# Patient Record
Sex: FEMALE
[De-identification: names, ages, dates, MRNs, and addresses within clinical notes are randomized per-mention and may not be internally consistent; named-entity substitution may affect disease eponyms.]

---

## 2024-08-15 ENCOUNTER — DASHBOARD ENCOUNTERS (OUTPATIENT)
Age: 29
End: 2024-08-15

## 2024-08-15 ENCOUNTER — OFFICE VISIT (OUTPATIENT)
Dept: URBAN - METROPOLITAN AREA CLINIC 111 | Facility: CLINIC | Age: 29
End: 2024-08-15
Payer: COMMERCIAL

## 2024-08-15 VITALS
DIASTOLIC BLOOD PRESSURE: 76 MMHG | HEIGHT: 65 IN | SYSTOLIC BLOOD PRESSURE: 118 MMHG | HEART RATE: 93 BPM | WEIGHT: 163.6 LBS | TEMPERATURE: 97.9 F | BODY MASS INDEX: 27.26 KG/M2

## 2024-08-15 DIAGNOSIS — K62.5 RECTAL BLEED: ICD-10-CM

## 2024-08-15 DIAGNOSIS — K64.8 INTERNAL HEMORRHOID: ICD-10-CM

## 2024-08-15 DIAGNOSIS — K59.09 OTHER CONSTIPATION: ICD-10-CM

## 2024-08-15 PROCEDURE — 99203 OFFICE O/P NEW LOW 30 MIN: CPT | Performed by: STUDENT IN AN ORGANIZED HEALTH CARE EDUCATION/TRAINING PROGRAM

## 2024-08-15 RX ORDER — PRENATAL WITH FERROUS FUM AND FOLIC ACID 3080; 920; 120; 400; 22; 1.84; 3; 20; 10; 1; 12; 200; 27; 25; 2 [IU]/1; [IU]/1; MG/1; [IU]/1; MG/1; MG/1; MG/1; MG/1; MG/1; MG/1; UG/1; MG/1; MG/1; MG/1; MG/1
1 TABLET TABLET ORAL ONCE A DAY
Status: ACTIVE | COMMUNITY

## 2024-08-15 RX ORDER — HYDROCORTISONE ACETATE 25 MG/1
1 SUPPOSITORY SUPPOSITORY RECTAL ONCE A DAY
Qty: 14 SUPPOSITORY | Refills: 2 | OUTPATIENT
Start: 2024-08-15 | End: 2024-09-26

## 2024-08-15 NOTE — HPI-TODAY'S VISIT:
29 yo F  17 weeks pregnant.   A few weeks ago she had sudden onset nausea, vomiting.  A few hours later had watery diarrhea.  Her OB thought it was a GI bug. That day she was sipping pedialyte and ate crackers.  Lasted for 2 days.  By the 3rd day she was okay.  One week later she was having a difficult BM to pass, saw bright red blood in toilet.  Happened 3 days in a row, less each time.  She was recommended to get stool softener. She has started metamucil.